# Patient Record
Sex: MALE | Race: WHITE
[De-identification: names, ages, dates, MRNs, and addresses within clinical notes are randomized per-mention and may not be internally consistent; named-entity substitution may affect disease eponyms.]

---
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
## 2024-01-29 ENCOUNTER — APPOINTMENT (OUTPATIENT)
Dept: ORTHOPEDIC SURGERY | Facility: CLINIC | Age: 19
End: 2024-01-29
Payer: COMMERCIAL

## 2024-01-29 VITALS — BODY MASS INDEX: 24.38 KG/M2 | HEIGHT: 74 IN | WEIGHT: 190 LBS

## 2024-01-29 DIAGNOSIS — S29.011A STRAIN OF MUSCLE AND TENDON OF FRONT WALL OF THORAX, INITIAL ENCOUNTER: ICD-10-CM

## 2024-01-29 DIAGNOSIS — M25.512 PAIN IN LEFT SHOULDER: ICD-10-CM

## 2024-01-29 PROCEDURE — 99214 OFFICE O/P EST MOD 30 MIN: CPT

## 2024-01-29 NOTE — IMAGING
[Left] : left shoulder [There are no fractures, subluxations or dislocations. No significant abnormalities are seen] : There are no fractures, subluxations or dislocations. No significant abnormalities are seen [FreeTextEntry1] : X-Ray left shoulder/scapula is unremarkable  [de-identified] : The patient is a well appearing 18 year  old male of their stated age.  Neck is supple & nontender to palpation. Negative Spurling's test.   Effected Shoulder: LEFT  Inspection:  Scapula Winging: POSITIVE  Deformity: None  Erythema: None  Ecchymosis: None  Abrasions: None  Effusion: None   Range of Motion:  Active Forward Flexion: 180 degrees   Active Abduction: 180 degrees   Passive Forward Flexion: 180 degrees   Passive Abduction: 180 degrees   ER @ 90 degrees: 90 degrees  IR @ 90 degrees: 45 degrees  ER @ 0 degrees: 50 degrees  Motor Exam:  Forward Flexion: 5 out of 5  Flexion Plane of Scapula: 5 out of 5  Abduction: 5 out of 5  Internal Rotation: 5 out of 5  External Rotation: 5 out of 5  Distal Motor Strength: 5 out of 5   Stability Testing:  Anterior: 2+  Posterior: 1+  Sulcus N: 1+  Sulcus ER: 1+  Provocative Tests:  Drop Arm: Negative  Impingement: Negative  Talbot: Negative  X-Arm Adduction: Negative  Belly Press: Negative  Bear Hug: Negative  Lift Off: Negative  Apprehension: Negative  Relocation: Negative  Posterior Load & Shift: Negative   Palpation:  AC Joint: Nontender  Clavicle: Nontender  SC Joint: Nontender  Bicepital Groove: Nontender  Coracoid Process: Nontender  Pectoralis Minor Tendon: TENDER  Pectoralis Major Tendon: Nontender & palpably intact  Latissimus Dorsi: Nontender   Proximal Humerus: Nontender  Scapula Body: Nontender  Medial Scapula Boarder: Nontender  Scapula Spine: Nontender   Neurologic Exam: Sensation to Light Touch:  Axillary: Grossly intact  Ulnar: Grossly intact  Radial: Grossly intact  Median: Grossly intact  Other:  N/A  Circulatory/Pulses:  Ulnar: 2+  Radial: 2+  Other Pertinent Findings: None   Contralateral Shoulder  Range of Motion:  Active Forward Flexion: 180 degrees   Active Abduction: 180 degrees   Passive Forward Flexion: 180 degrees  Passive Abduction: 180 degrees   ER @ 90 degrees: 90 degrees  IR @ 90 degrees: 45 degrees  ER @ 0 degrees: 50 degrees  Motor Exam:  Forward Flexion: 5 out of 5  Flexion Plane of Scapula: 5 out of 5  Abduction: 5 out of 5  Internal Rotation: 5 out of 5  External Rotation: 5 out of 5  Distal Motor Strength: 5 out of 5  Stability Testing:  Anterior: 1+  Posterior: 1+  Sulcus N: 1+  Sulcus ER: 1+  Other Pertinent Findings: None   Assessment: The patient is a 18 year old male with left shoulder pain and radiographic and physical exam findings consistent with pec minor strain The patient's condition is acute  Documents/Results Reviewed Today: MRI arthrogram left shoulder  Tests/Studies Independently Interpreted Today: MRI arthrogram left shoulder reveals benign Pertinent findings include: 180/180/90/45/50, tender pectoralis major, slight scapula winging  Confounding medical conditions/concerns: None  Plan: Patient will continue physical therapy, HEP, and stretching. Emphasized the importance of working on his biomechanics while throwing and swinging. Advised patient to rest and ice the area as tolerated. Take OTC antiinflammatories as needed - use as directed. Modify activity as discussed. Tests Ordered: None  Prescription Medications Ordered: Discussed appropriate use of OTC anti-inflammatories and topical analgesics (including but not limited to Aleve, Advil, Tylenol, Motrin, Ibuprofen, Voltaren gel, etc.)  Braces/DME Ordered: None  Activity/Work/Sports Status: None  Additional Instructions: None Follow-Up: PRN   The patient's current medication management of their orthopedic diagnosis was reviewed today: (1) We discussed a comprehensive treatment plan that included possible pharmaceutical management involving the use of prescription strength medications including but not limited to options such as oral Naprosyn 500mg BID, once daily Meloxicam 15 mg, or 500-650 mg Tylenol versus over the counter oral medications and topical prescription NSAID Pennsaid vs over the counter Voltaren gel.  Based on our extensive discussion, the patient declined prescription medication and will use over the counter Advil, Alleve, Voltaren Gel or Tylenol as directed. (2) There is a moderate risk of morbidity with further treatment, especially from use of prescription strength medications and possible side effects of these medications which include upset stomach with oral medications, skin reactions to topical medications and cardiac/renal issues with long term use. (3) I recommended that the patient follow-up with their medical physician to discuss any significant specific potential issues with long term medication use such as interactions with current medications or with exacerbation of underlying medical comorbidities. (4) The benefits and risks associated with use of injectable, oral or topical, prescription and over the counter anti-inflammatory medications were discussed with the patient. The patient voiced understanding of the risks including but not limited to bleeding, stroke, kidney dysfunction, heart disease, and were referred to the black box warning label for further information.  I, Vicky Gibbs, attest that this documentation has been prepared under the direction and in the presence of Provider Dr. Prudencio Garcia.  The documentation recorded by the scribe accurately reflects the services I, Dr. Prudencio Garcia, personally performed and the decisions made by me.

## 2024-01-29 NOTE — HISTORY OF PRESENT ILLNESS
[de-identified] : The patient is a 18 year  old right hand dominant male who presents today complaining of left shoulder pain.   Date of Injury/Onset: 6/25/23 Pain:    At Rest: 2/10  With Activity:  7/10  Mechanism of injury: had a collision with another player and injured his left shoulder This is not a Work Related Injury being treated under Worker's Compensation. This is not an athletic injury occurring associated with an interscholastic or organized sports team. Quality of symptoms: burning pain down arm after activity Improves with: rest, ice, stretching, HEP Worse with: swinging a baseball bat Treatment/Imaging/Studies Since Last Visit: MRI Arthrogram (reviewed by Wilberto), PT/HEP 	Reports Available For Review Today: MRI report Out of work/sport: currently playing School/Sport/Position/Occupation: Student at [employer]  Additional Information: Pt had the MRI Arthrogram prescribed by Dr. Garcia and reviewed with Wilberto Luo, he was feeling better and went back to playing, and recently had a bit of setback and just wanted to come in for an exam before any of his pain got worse.

## 2024-08-30 ENCOUNTER — APPOINTMENT (OUTPATIENT)
Dept: PEDIATRICS | Facility: CLINIC | Age: 19
End: 2024-08-30

## 2024-10-22 NOTE — HISTORY OF PRESENT ILLNESS
[de-identified] : The patient is presenting to the office c/o ongoing left shoulder pain x 2 months. Pain level was gradual. He was seen by Dr. Mendes and presents with MRI. Patient is a college . Patient is right handed, throws with his left hand.  He complains of progressive symptoms since initial onset. Patient now c/o spasms and numbness/tingling in the left shoulder blade region. Completed Pt for several weeks, with some relief. 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  He reports significant pain in the pec area with adduction as he initiates his swing.  He has an MRI of the pectoralis which demonstrates edema in the short head of the biceps.\par Patient also has some pain in the shoulder blade.  Patient reports no periods of prolonged rest only 1 week intermittently.  He is here for second opinion.\par \par 6/28/23: Patient here for left shoulder pain.  He was previously treated for a short head of the biceps strain and had complete recovery with return to play. Pt was symptom free for 2 months up until a recent collision while playing a baseball game 3 days ago.  New onset of pain now in the anterior aspect of the shoulder in the region of the pec insertion.    Detail Level: Detailed